# Patient Record
Sex: MALE | Race: BLACK OR AFRICAN AMERICAN | NOT HISPANIC OR LATINO
[De-identification: names, ages, dates, MRNs, and addresses within clinical notes are randomized per-mention and may not be internally consistent; named-entity substitution may affect disease eponyms.]

---

## 2017-08-30 ENCOUNTER — MEDICATION RENEWAL (OUTPATIENT)
Age: 57
End: 2017-08-30

## 2017-08-30 PROBLEM — Z00.00 ENCOUNTER FOR PREVENTIVE HEALTH EXAMINATION: Status: ACTIVE | Noted: 2017-08-30

## 2017-10-02 ENCOUNTER — MEDICATION RENEWAL (OUTPATIENT)
Age: 57
End: 2017-10-02

## 2017-10-02 RX ORDER — OXYCODONE AND ACETAMINOPHEN 10; 325 MG/1; MG/1
10-325 TABLET ORAL EVERY 8 HOURS
Qty: 90 | Refills: 0 | Status: COMPLETED | COMMUNITY
Start: 2017-08-30 | End: 2017-10-02

## 2017-10-30 ENCOUNTER — MEDICATION RENEWAL (OUTPATIENT)
Age: 57
End: 2017-10-30

## 2017-10-30 RX ORDER — OXYCODONE AND ACETAMINOPHEN 10; 325 MG/1; MG/1
10-325 TABLET ORAL EVERY 8 HOURS
Qty: 90 | Refills: 0 | Status: COMPLETED | COMMUNITY
Start: 2017-10-02 | End: 2017-10-30

## 2017-11-27 ENCOUNTER — MEDICATION RENEWAL (OUTPATIENT)
Age: 57
End: 2017-11-27

## 2017-11-27 RX ORDER — OXYCODONE AND ACETAMINOPHEN 10; 325 MG/1; MG/1
10-325 TABLET ORAL
Qty: 90 | Refills: 0 | Status: COMPLETED | COMMUNITY
Start: 2017-10-30 | End: 2017-11-27

## 2017-12-26 ENCOUNTER — MEDICATION RENEWAL (OUTPATIENT)
Age: 57
End: 2017-12-26

## 2017-12-26 RX ORDER — OXYCODONE AND ACETAMINOPHEN 10; 325 MG/1; MG/1
10-325 TABLET ORAL
Qty: 90 | Refills: 0 | Status: COMPLETED | COMMUNITY
Start: 2017-11-27 | End: 2017-12-26

## 2018-01-25 ENCOUNTER — APPOINTMENT (OUTPATIENT)
Dept: NEUROLOGY | Facility: CLINIC | Age: 58
End: 2018-01-25
Payer: MEDICARE

## 2018-01-25 ENCOUNTER — MEDICATION RENEWAL (OUTPATIENT)
Age: 58
End: 2018-01-25

## 2018-01-25 VITALS
BODY MASS INDEX: 30.23 KG/M2 | WEIGHT: 154 LBS | HEIGHT: 60 IN | SYSTOLIC BLOOD PRESSURE: 125 MMHG | DIASTOLIC BLOOD PRESSURE: 80 MMHG

## 2018-01-25 DIAGNOSIS — Z86.39 PERSONAL HISTORY OF OTHER ENDOCRINE, NUTRITIONAL AND METABOLIC DISEASE: ICD-10-CM

## 2018-01-25 PROCEDURE — 99214 OFFICE O/P EST MOD 30 MIN: CPT

## 2018-01-25 RX ORDER — LISINOPRIL 30 MG/1
TABLET ORAL
Refills: 0 | Status: ACTIVE | COMMUNITY

## 2018-01-25 RX ORDER — SIMVASTATIN 80 MG/1
TABLET, FILM COATED ORAL
Refills: 0 | Status: ACTIVE | COMMUNITY

## 2018-01-25 RX ORDER — METFORMIN HYDROCHLORIDE 625 MG/1
TABLET ORAL
Refills: 0 | Status: ACTIVE | COMMUNITY

## 2018-01-25 RX ORDER — OXYCODONE AND ACETAMINOPHEN 10; 325 MG/1; MG/1
10-325 TABLET ORAL
Qty: 90 | Refills: 0 | Status: COMPLETED | COMMUNITY
Start: 2017-12-26 | End: 2018-01-25

## 2018-02-22 ENCOUNTER — MEDICATION RENEWAL (OUTPATIENT)
Age: 58
End: 2018-02-22

## 2018-02-22 RX ORDER — OXYCODONE AND ACETAMINOPHEN 10; 325 MG/1; MG/1
10-325 TABLET ORAL
Qty: 90 | Refills: 0 | Status: COMPLETED | COMMUNITY
Start: 2018-01-25 | End: 2018-02-22

## 2018-03-22 ENCOUNTER — MEDICATION RENEWAL (OUTPATIENT)
Age: 58
End: 2018-03-22

## 2018-03-22 RX ORDER — OXYCODONE AND ACETAMINOPHEN 10; 325 MG/1; MG/1
10-325 TABLET ORAL
Qty: 90 | Refills: 0 | Status: COMPLETED | COMMUNITY
Start: 2018-02-22 | End: 2018-03-22

## 2018-04-19 ENCOUNTER — MEDICATION RENEWAL (OUTPATIENT)
Age: 58
End: 2018-04-19

## 2018-04-19 RX ORDER — OXYCODONE AND ACETAMINOPHEN 10; 325 MG/1; MG/1
10-325 TABLET ORAL
Qty: 90 | Refills: 0 | Status: COMPLETED | COMMUNITY
Start: 2018-03-22 | End: 2018-04-19

## 2018-05-15 ENCOUNTER — MEDICATION RENEWAL (OUTPATIENT)
Age: 58
End: 2018-05-15

## 2018-05-15 RX ORDER — OXYCODONE AND ACETAMINOPHEN 10; 325 MG/1; MG/1
10-325 TABLET ORAL
Qty: 90 | Refills: 0 | Status: COMPLETED | COMMUNITY
Start: 2018-04-19 | End: 2018-05-15

## 2018-06-15 ENCOUNTER — MEDICATION RENEWAL (OUTPATIENT)
Age: 58
End: 2018-06-15

## 2018-06-15 RX ORDER — OXYCODONE AND ACETAMINOPHEN 10; 325 MG/1; MG/1
10-325 TABLET ORAL
Qty: 90 | Refills: 0 | Status: COMPLETED | COMMUNITY
Start: 2018-05-15 | End: 2018-06-15

## 2018-07-16 ENCOUNTER — MEDICATION RENEWAL (OUTPATIENT)
Age: 58
End: 2018-07-16

## 2018-07-26 ENCOUNTER — APPOINTMENT (OUTPATIENT)
Dept: NEUROLOGY | Facility: CLINIC | Age: 58
End: 2018-07-26
Payer: MEDICARE

## 2018-07-26 VITALS
SYSTOLIC BLOOD PRESSURE: 120 MMHG | BODY MASS INDEX: 29.45 KG/M2 | WEIGHT: 150 LBS | DIASTOLIC BLOOD PRESSURE: 72 MMHG | HEIGHT: 60 IN

## 2018-07-26 PROCEDURE — 99213 OFFICE O/P EST LOW 20 MIN: CPT

## 2018-07-26 NOTE — HISTORY OF PRESENT ILLNESS
[FreeTextEntry1] : I saw this patient in the office today.\par He presents with his sister as usual. \par \par As you recall he has a history of traumatic brain injury and chronic headaches.\par He has residual right-sided spastic hemiparesis\par His headaches are now occurring on a daily basis. \par They wax and wane in intensity.\par When severe they are associated with some dizziness.\par \par He uses Percocet as needed for the pain. \par It has been the only medication that has worked for him.\par \par \par

## 2018-07-26 NOTE — PHYSICAL EXAM
[General Appearance - Alert] : alert [Oriented To Time, Place, And Person] : oriented to person, place, and time [Cranial Nerves Optic (II)] : visual acuity intact bilaterally,  visual fields full to confrontation, pupils equal round and reactive to light [Cranial Nerves Oculomotor (III)] : extraocular motion intact [Cranial Nerves Trigeminal (V)] : facial sensation intact symmetrically [Cranial Nerves Facial (VII)] : face symmetrical [Cranial Nerves Vestibulocochlear (VIII)] : hearing was intact bilaterally [Cranial Nerves Glossopharyngeal (IX)] : tongue and palate midline [Cranial Nerves Accessory (XI - Cranial And Spinal)] : head turning and shoulder shrug symmetric [Cranial Nerves Hypoglossal (XII)] : there was no tongue deviation with protrusion [Sensation Tactile Decrease] : light touch was intact [Sensation Vibration Decrease] : vibration was intact [3+] : Patella right 3+ [2+] : Patella left 2+ [Plantar Reflex Right Only] : abnormal on the right [FreeTextEntry1] : There is some mild impairment in recent memory as compared to remote memory. [Coordination - Dysmetria Impaired Finger-to-Nose Left Only] : not present on the left side [Plantar Reflex Left Only] : normal on the left [FreeTextEntry6] : There is increased tone throughout the right side. [FreeTextEntry8] : Gait is broad-based and he uses a walker for support. He circumduct the right leg. Balance is impaired.

## 2018-07-26 NOTE — ASSESSMENT
[FreeTextEntry1] : This is a 50-year-old man with a history of traumatic brain injury.\par He has residual spastic right sided hemiparesis.\par \par He also has residual headaches.\par He uses Percocet tablets for this.\par While this has been the only medication that has worked for his headaches, he does continue to report that his current dose maintains good pain control.\par \par I will see him back in 6 months.

## 2018-07-26 NOTE — DATA REVIEWED
[de-identified] : CT scan of the head was performed on 2/17/16. There were chronic changes related to his prior injury. There was no acute pathology.

## 2018-07-26 NOTE — CONSULT LETTER
[Dear  ___] : Dear ~MATT, [Courtesy Letter:] : I had the pleasure of seeing your patient, [unfilled], in my office today. [Please see my note below.] : Please see my note below. [Consult Closing:] : Thank you very much for allowing me to participate in the care of this patient.  If you have any questions, please do not hesitate to contact me. [Sincerely,] : Sincerely, [FreeTextEntry3] : Dionte Solitario MD.

## 2018-08-13 ENCOUNTER — MEDICATION RENEWAL (OUTPATIENT)
Age: 58
End: 2018-08-13

## 2018-09-14 ENCOUNTER — MEDICATION RENEWAL (OUTPATIENT)
Age: 58
End: 2018-09-14

## 2018-10-10 ENCOUNTER — MEDICATION RENEWAL (OUTPATIENT)
Age: 58
End: 2018-10-10

## 2018-11-07 ENCOUNTER — MEDICATION RENEWAL (OUTPATIENT)
Age: 58
End: 2018-11-07

## 2018-12-07 ENCOUNTER — MEDICATION RENEWAL (OUTPATIENT)
Age: 58
End: 2018-12-07

## 2019-01-08 ENCOUNTER — MEDICATION RENEWAL (OUTPATIENT)
Age: 59
End: 2019-01-08

## 2019-01-24 ENCOUNTER — APPOINTMENT (OUTPATIENT)
Dept: NEUROLOGY | Facility: CLINIC | Age: 59
End: 2019-01-24
Payer: MEDICARE

## 2019-01-24 VITALS
DIASTOLIC BLOOD PRESSURE: 70 MMHG | SYSTOLIC BLOOD PRESSURE: 115 MMHG | WEIGHT: 150 LBS | BODY MASS INDEX: 29.45 KG/M2 | HEIGHT: 60 IN

## 2019-01-24 PROCEDURE — 99214 OFFICE O/P EST MOD 30 MIN: CPT

## 2019-01-24 NOTE — DATA REVIEWED
[de-identified] : CT scan of the head was performed on 2/17/16. \par There were chronic changes related to his prior injury. \par There was no acute pathology.\par

## 2019-01-24 NOTE — REASON FOR VISIT
[Follow-Up: _____] : a [unfilled] follow-up visit [Other: _____] : [unfilled] [FreeTextEntry1] : CC: traumatic brain injury and headache

## 2019-01-24 NOTE — ASSESSMENT
[FreeTextEntry1] : This is a 58 year-old man with a history of traumatic brain injury.\par He has residual spastic right sided hemiparesis.\par He does physical therapy for this. \par \par He also has residual headaches.\par He uses Percocet tablets for this.\par While this has been the only medication that has worked for his headaches, he does continue to report that his current dose maintains good pain control.\par \par I will see him back in 6 months.

## 2019-01-24 NOTE — PHYSICAL EXAM
[General Appearance - Alert] : alert [General Appearance - In No Acute Distress] : in no acute distress [Oriented To Time, Place, And Person] : oriented to person, place, and time [Cranial Nerves Optic (II)] : visual acuity intact bilaterally,  visual fields full to confrontation, pupils equal round and reactive to light [Cranial Nerves Oculomotor (III)] : extraocular motion intact [Cranial Nerves Trigeminal (V)] : facial sensation intact symmetrically [Cranial Nerves Facial (VII)] : face symmetrical [Cranial Nerves Vestibulocochlear (VIII)] : hearing was intact bilaterally [Cranial Nerves Glossopharyngeal (IX)] : tongue and palate midline [Cranial Nerves Accessory (XI - Cranial And Spinal)] : head turning and shoulder shrug symmetric [Cranial Nerves Hypoglossal (XII)] : there was no tongue deviation with protrusion [Sensation Tactile Decrease] : light touch was intact [Sensation Pain / Temperature Decrease] : pain and temperature was intact [Sensation Vibration Decrease] : vibration was intact [3+] : Patella right 3+ [2+] : Patella left 2+ [Plantar Reflex Right Only] : abnormal on the right [Optic Disc Abnormality] : the optic disc were normal in size and color [Edema] : there was no peripheral edema [Involuntary Movements] : no involuntary movements were seen [FreeTextEntry1] : There is some mild impairment in recent memory as compared to remote memory. [Coordination - Dysmetria Impaired Finger-to-Nose Left Only] : not present on the left side [Plantar Reflex Left Only] : normal on the left [FreeTextEntry6] : There is increased tone throughout the right side. [FreeTextEntry8] : Gait is broad-based and he uses a walker for support. He circumduct the right leg. Balance is impaired.

## 2019-02-07 ENCOUNTER — MEDICATION RENEWAL (OUTPATIENT)
Age: 59
End: 2019-02-07

## 2019-03-07 ENCOUNTER — MEDICATION RENEWAL (OUTPATIENT)
Age: 59
End: 2019-03-07

## 2019-04-05 ENCOUNTER — MEDICATION RENEWAL (OUTPATIENT)
Age: 59
End: 2019-04-05

## 2019-05-06 ENCOUNTER — MEDICATION RENEWAL (OUTPATIENT)
Age: 59
End: 2019-05-06

## 2019-05-31 ENCOUNTER — MEDICATION RENEWAL (OUTPATIENT)
Age: 59
End: 2019-05-31

## 2019-07-02 ENCOUNTER — MEDICATION RENEWAL (OUTPATIENT)
Age: 59
End: 2019-07-02

## 2019-07-18 ENCOUNTER — APPOINTMENT (OUTPATIENT)
Dept: NEUROLOGY | Facility: CLINIC | Age: 59
End: 2019-07-18
Payer: MEDICARE

## 2019-07-18 PROCEDURE — 99213 OFFICE O/P EST LOW 20 MIN: CPT

## 2019-07-18 NOTE — DATA REVIEWED
[de-identified] : CT scan of the head was performed on 2/17/16. \par There were chronic changes related to his prior injury. \par There was no acute pathology.\par

## 2019-07-18 NOTE — ASSESSMENT
[FreeTextEntry1] : This is a 59 year-old man with a history of traumatic brain injury.\par He has residual spastic right sided hemiparesis.\par He does physical therapy for this. \par \par He also has residual headaches.\par He uses Percocet tablets for this.\par While this has been the only medication that has worked for his headaches, he does continue to report that his current dose maintains good pain control.\par \par I will see him back in 6 months.

## 2019-07-24 ENCOUNTER — APPOINTMENT (OUTPATIENT)
Dept: NEUROLOGY | Facility: CLINIC | Age: 59
End: 2019-07-24

## 2019-07-30 ENCOUNTER — MEDICATION RENEWAL (OUTPATIENT)
Age: 59
End: 2019-07-30

## 2019-08-26 ENCOUNTER — MEDICATION RENEWAL (OUTPATIENT)
Age: 59
End: 2019-08-26

## 2019-09-26 ENCOUNTER — MEDICATION RENEWAL (OUTPATIENT)
Age: 59
End: 2019-09-26

## 2019-10-21 ENCOUNTER — MEDICATION RENEWAL (OUTPATIENT)
Age: 59
End: 2019-10-21

## 2019-11-22 ENCOUNTER — MEDICATION RENEWAL (OUTPATIENT)
Age: 59
End: 2019-11-22

## 2019-12-20 ENCOUNTER — MEDICATION RENEWAL (OUTPATIENT)
Age: 59
End: 2019-12-20

## 2020-01-15 ENCOUNTER — APPOINTMENT (OUTPATIENT)
Dept: NEUROLOGY | Facility: CLINIC | Age: 60
End: 2020-01-15
Payer: MEDICARE

## 2020-01-15 VITALS
HEIGHT: 60 IN | WEIGHT: 151 LBS | SYSTOLIC BLOOD PRESSURE: 102 MMHG | BODY MASS INDEX: 29.64 KG/M2 | DIASTOLIC BLOOD PRESSURE: 70 MMHG

## 2020-01-15 PROCEDURE — 99213 OFFICE O/P EST LOW 20 MIN: CPT

## 2020-01-15 NOTE — PHYSICAL EXAM
[General Appearance - Alert] : alert [General Appearance - In No Acute Distress] : in no acute distress [Oriented To Time, Place, And Person] : oriented to person, place, and time [Cranial Nerves Optic (II)] : visual acuity intact bilaterally,  visual fields full to confrontation, pupils equal round and reactive to light [Cranial Nerves Oculomotor (III)] : extraocular motion intact [Cranial Nerves Trigeminal (V)] : facial sensation intact symmetrically [Cranial Nerves Vestibulocochlear (VIII)] : hearing was intact bilaterally [Cranial Nerves Facial (VII)] : face symmetrical [Cranial Nerves Hypoglossal (XII)] : there was no tongue deviation with protrusion [Cranial Nerves Glossopharyngeal (IX)] : tongue and palate midline [Cranial Nerves Accessory (XI - Cranial And Spinal)] : head turning and shoulder shrug symmetric [Sensation Vibration Decrease] : vibration was intact [Sensation Pain / Temperature Decrease] : pain and temperature was intact [Sensation Tactile Decrease] : light touch was intact [2+] : Patella left 2+ [3+] : Patella right 3+ [Plantar Reflex Right Only] : abnormal on the right [Optic Disc Abnormality] : the optic disc were normal in size and color [Edema] : there was no peripheral edema [Involuntary Movements] : no involuntary movements were seen [FreeTextEntry1] : There is some mild impairment in recent memory as compared to remote memory. [Plantar Reflex Left Only] : normal on the left [Coordination - Dysmetria Impaired Finger-to-Nose Left Only] : not present on the left side [FreeTextEntry8] : Gait is broad-based and he uses a walker for support. He circumduct the right leg. Balance is impaired. [FreeTextEntry6] : There is increased tone throughout the right side.

## 2020-01-15 NOTE — CONSULT LETTER
[Dear  ___] : Dear ~MATT, [Courtesy Letter:] : I had the pleasure of seeing your patient, [unfilled], in my office today. [Consult Closing:] : Thank you very much for allowing me to participate in the care of this patient.  If you have any questions, please do not hesitate to contact me. [Please see my note below.] : Please see my note below. [Sincerely,] : Sincerely, [FreeTextEntry3] : Dionte Solitario MD.

## 2020-01-15 NOTE — DATA REVIEWED
[de-identified] : CT scan of the head was performed on 2/17/16. \par There were chronic changes related to his prior injury. \par There was no acute pathology.\par

## 2020-07-16 ENCOUNTER — APPOINTMENT (OUTPATIENT)
Dept: NEUROLOGY | Facility: CLINIC | Age: 60
End: 2020-07-16
Payer: MEDICARE

## 2020-07-16 VITALS
SYSTOLIC BLOOD PRESSURE: 140 MMHG | WEIGHT: 151 LBS | DIASTOLIC BLOOD PRESSURE: 80 MMHG | TEMPERATURE: 97.6 F | HEIGHT: 60 IN | BODY MASS INDEX: 29.64 KG/M2

## 2020-07-16 DIAGNOSIS — Z86.79 PERSONAL HISTORY OF OTHER DISEASES OF THE CIRCULATORY SYSTEM: ICD-10-CM

## 2020-07-16 PROCEDURE — 99214 OFFICE O/P EST MOD 30 MIN: CPT

## 2020-07-16 NOTE — HISTORY OF PRESENT ILLNESS
[FreeTextEntry1] : I saw this patient in the office today.\par He presents with his sister as usual. \par \par As you recall he has a history of traumatic brain injury and chronic headaches.\par He has residual right-sided spastic hemiparesis\par His headaches are now occurring on a daily basis. \par They wax and wane in intensity.\par When severe they are associated with some dizziness.\par \par He uses Percocet as needed for the pain. \par It has been the only medication that has worked for him.\par \par He uses a walker for ambulation at baseline.\par His family reports that he has fallen on a few occasions since his last visit in January.\par

## 2020-07-16 NOTE — PHYSICAL EXAM
[General Appearance - Alert] : alert [General Appearance - In No Acute Distress] : in no acute distress [Oriented To Time, Place, And Person] : oriented to person, place, and time [Cranial Nerves Optic (II)] : visual acuity intact bilaterally,  visual fields full to confrontation, pupils equal round and reactive to light [Cranial Nerves Oculomotor (III)] : extraocular motion intact [Cranial Nerves Trigeminal (V)] : facial sensation intact symmetrically [Cranial Nerves Facial (VII)] : face symmetrical [Cranial Nerves Vestibulocochlear (VIII)] : hearing was intact bilaterally [Cranial Nerves Glossopharyngeal (IX)] : tongue and palate midline [Cranial Nerves Accessory (XI - Cranial And Spinal)] : head turning and shoulder shrug symmetric [Cranial Nerves Hypoglossal (XII)] : there was no tongue deviation with protrusion [Sensation Tactile Decrease] : light touch was intact [Sensation Pain / Temperature Decrease] : pain and temperature was intact [Sensation Vibration Decrease] : vibration was intact [3+] : Patella right 3+ [2+] : Patella left 2+ [Plantar Reflex Right Only] : abnormal on the right [Optic Disc Abnormality] : the optic disc were normal in size and color [Edema] : there was no peripheral edema [Involuntary Movements] : no involuntary movements were seen [FreeTextEntry1] : There is some mild impairment in recent memory as compared to remote memory. [Plantar Reflex Left Only] : normal on the left [Coordination - Dysmetria Impaired Finger-to-Nose Left Only] : not present on the left side [FreeTextEntry6] : There is increased tone throughout the right side. [FreeTextEntry8] : Gait is broad-based and he uses a walker for support. He circumduct the right leg. Balance is impaired.

## 2020-07-16 NOTE — ASSESSMENT
[FreeTextEntry1] : This is a 60 year-old man with a history of traumatic brain injury.\par He has residual spastic right sided hemiparesis.\par He does physical therapy for this. \par I would recommend he use a helmet when he is ambulating outside.\par \par He also has residual headaches.\par He uses Percocet tablets for this.\par While this has been the only medication that has worked for his headaches, he does continue to report that his current dose maintains good pain control.\par \par Blood Pressure was fairly well controlled today.\par He has followup with his primary care this week.\par \par I will see him back in 6 months.

## 2021-02-17 ENCOUNTER — APPOINTMENT (OUTPATIENT)
Dept: NEUROLOGY | Facility: CLINIC | Age: 61
End: 2021-02-17

## 2021-04-08 ENCOUNTER — APPOINTMENT (OUTPATIENT)
Dept: NEUROLOGY | Facility: CLINIC | Age: 61
End: 2021-04-08
Payer: MEDICARE

## 2021-04-08 VITALS
SYSTOLIC BLOOD PRESSURE: 120 MMHG | HEIGHT: 60 IN | BODY MASS INDEX: 29.45 KG/M2 | DIASTOLIC BLOOD PRESSURE: 70 MMHG | WEIGHT: 150 LBS | TEMPERATURE: 97.8 F

## 2021-04-08 PROCEDURE — 99214 OFFICE O/P EST MOD 30 MIN: CPT

## 2021-04-08 NOTE — DATA REVIEWED
[de-identified] : CT scan of the head was performed on 2/17/16. \par There were chronic changes related to his prior injury. \par There was no acute pathology.\par

## 2021-10-14 ENCOUNTER — APPOINTMENT (OUTPATIENT)
Dept: NEUROLOGY | Facility: CLINIC | Age: 61
End: 2021-10-14
Payer: MEDICARE

## 2021-10-14 VITALS
WEIGHT: 150 LBS | SYSTOLIC BLOOD PRESSURE: 120 MMHG | TEMPERATURE: 97.9 F | BODY MASS INDEX: 29.45 KG/M2 | HEIGHT: 60 IN | DIASTOLIC BLOOD PRESSURE: 70 MMHG

## 2021-10-14 PROCEDURE — 99213 OFFICE O/P EST LOW 20 MIN: CPT

## 2021-10-14 NOTE — ASSESSMENT
[FreeTextEntry1] : This is a 61 year-old man with a history of traumatic brain injury.\par He has residual spastic right sided hemiparesis.\par He does physical therapy for this. \par I recommended he use his helmet when he is ambulating outside.\par \par He also has residual headaches.\par He uses Percocet tablets for this.\par While this has been the only medication that has worked for his headaches, he does continue to report that his current dose maintains good pain control.\par \par I will see him back in 6 months.

## 2021-10-14 NOTE — DATA REVIEWED
[de-identified] : CT scan of the head was performed on 2/17/16. \par There were chronic changes related to his prior injury. \par There was no acute pathology.\par

## 2021-10-14 NOTE — HISTORY OF PRESENT ILLNESS
[FreeTextEntry1] : I saw this patient in the office today.\par He presents with his niece (as his sister was not feeling well today).\par \par As you recall he has a history of traumatic brain injury and chronic headaches.\par He has residual right-sided spastic hemiparesis\par His headaches are now occurring on a daily basis. \par They wax and wane in intensity.\par When severe they are associated with some dizziness.\par \par He uses Percocet as needed for the pain. \par It has been the only medication that has worked for him.\par \par He uses a walker for ambulation at baseline.\par \par

## 2022-04-14 ENCOUNTER — APPOINTMENT (OUTPATIENT)
Dept: NEUROLOGY | Facility: CLINIC | Age: 62
End: 2022-04-14
Payer: MEDICARE

## 2022-04-14 VITALS
HEIGHT: 60 IN | DIASTOLIC BLOOD PRESSURE: 80 MMHG | BODY MASS INDEX: 29.45 KG/M2 | SYSTOLIC BLOOD PRESSURE: 140 MMHG | WEIGHT: 150 LBS

## 2022-04-14 PROCEDURE — 99214 OFFICE O/P EST MOD 30 MIN: CPT

## 2022-04-14 NOTE — HISTORY OF PRESENT ILLNESS
[FreeTextEntry1] : I saw this patient in the office today.\par He presents with his niece (as his sister was not feeling well today).\par \par As you recall he has a history of traumatic brain injury and chronic headaches.\par He has residual right-sided spastic hemiparesis\par His headaches are now occurring on a daily basis. \par They wax and wane in intensity.\par When severe they are associated with some dizziness.\par \par He uses Percocet as needed for the pain. \par It has been the only medication that has worked for him.\par \par He uses a walker for ambulation at baseline.\par His gait and balance has been progressively declining.\par

## 2022-04-14 NOTE — DATA REVIEWED
[de-identified] : CT scan of the head was performed on 2/17/16. \par There were chronic changes related to his prior injury. \par There was no acute pathology.\par

## 2022-04-14 NOTE — PHYSICAL EXAM
[General Appearance - Alert] : alert [General Appearance - In No Acute Distress] : in no acute distress [Oriented To Time, Place, And Person] : oriented to person, place, and time [Cranial Nerves Optic (II)] : visual acuity intact bilaterally,  visual fields full to confrontation, pupils equal round and reactive to light [Cranial Nerves Oculomotor (III)] : extraocular motion intact [Cranial Nerves Trigeminal (V)] : facial sensation intact symmetrically [Cranial Nerves Facial (VII)] : face symmetrical [Cranial Nerves Vestibulocochlear (VIII)] : hearing was intact bilaterally [Cranial Nerves Glossopharyngeal (IX)] : tongue and palate midline [Cranial Nerves Accessory (XI - Cranial And Spinal)] : head turning and shoulder shrug symmetric [Cranial Nerves Hypoglossal (XII)] : there was no tongue deviation with protrusion [Sensation Tactile Decrease] : light touch was intact [Sensation Pain / Temperature Decrease] : pain and temperature was intact [Sensation Vibration Decrease] : vibration was intact [3+] : Patella right 3+ [2+] : Patella left 2+ [Plantar Reflex Right Only] : abnormal on the right [Optic Disc Abnormality] : the optic disc were normal in size and color [Edema] : there was no peripheral edema [Involuntary Movements] : no involuntary movements were seen [FreeTextEntry1] : There is some mild impairment in recent memory as compared to remote memory. [Limited Balance] : the patient's balance was impaired [Coordination - Dysmetria Impaired Finger-to-Nose Left Only] : not present on the left side [Plantar Reflex Left Only] : normal on the left [FreeTextEntry6] : There is increased tone throughout the right side. [FreeTextEntry8] : Gait is broad-based and he uses a walker for support. He circumduct the right leg. Balance is impaired.

## 2022-10-19 ENCOUNTER — APPOINTMENT (OUTPATIENT)
Dept: NEUROLOGY | Facility: CLINIC | Age: 62
End: 2022-10-19

## 2022-10-19 PROCEDURE — 99213 OFFICE O/P EST LOW 20 MIN: CPT

## 2022-10-19 RX ORDER — KETOCONAZOLE 20 MG/G
2 CREAM TOPICAL
Qty: 60 | Refills: 0 | Status: ACTIVE | COMMUNITY
Start: 2022-09-15

## 2022-10-19 RX ORDER — PEN NEEDLE, DIABETIC 32GX 5/32"
70 NEEDLE, DISPOSABLE MISCELLANEOUS
Qty: 100 | Refills: 0 | Status: ACTIVE | COMMUNITY
Start: 2022-02-07

## 2022-10-19 RX ORDER — LATANOPROST/PF 0.005 %
0.01 DROPS OPHTHALMIC (EYE)
Qty: 7 | Refills: 0 | Status: ACTIVE | COMMUNITY
Start: 2022-05-17

## 2022-10-19 RX ORDER — ATORVASTATIN CALCIUM 40 MG/1
40 TABLET, FILM COATED ORAL
Qty: 90 | Refills: 0 | Status: ACTIVE | COMMUNITY
Start: 2022-03-28

## 2022-10-19 NOTE — ASSESSMENT
[FreeTextEntry1] : This is a 62 year-old man with a history of traumatic brain injury.\par He has residual spastic right sided hemiparesis.\par He does physical therapy for this. \par I recommended he use his helmet when he is ambulating outside.\par \par He also has residual headaches.\par He uses Percocet tablets for this.\par While this has been the only medication that has worked for his headaches, he does continue to report that his current dose maintains good pain control.\par \par I will see him back in 6 months.

## 2022-10-19 NOTE — PHYSICAL EXAM
[General Appearance - Alert] : alert [General Appearance - In No Acute Distress] : in no acute distress [Oriented To Time, Place, And Person] : oriented to person, place, and time [Cranial Nerves Optic (II)] : visual acuity intact bilaterally,  visual fields full to confrontation, pupils equal round and reactive to light [Cranial Nerves Oculomotor (III)] : extraocular motion intact [Cranial Nerves Trigeminal (V)] : facial sensation intact symmetrically [Cranial Nerves Facial (VII)] : face symmetrical [Cranial Nerves Vestibulocochlear (VIII)] : hearing was intact bilaterally [Cranial Nerves Glossopharyngeal (IX)] : tongue and palate midline [Cranial Nerves Accessory (XI - Cranial And Spinal)] : head turning and shoulder shrug symmetric [Cranial Nerves Hypoglossal (XII)] : there was no tongue deviation with protrusion [Sensation Tactile Decrease] : light touch was intact [Sensation Pain / Temperature Decrease] : pain and temperature was intact [Sensation Vibration Decrease] : vibration was intact [Limited Balance] : the patient's balance was impaired [3+] : Patella right 3+ [2+] : Patella left 2+ [Plantar Reflex Right Only] : abnormal on the right [Optic Disc Abnormality] : the optic disc were normal in size and color [Edema] : there was no peripheral edema [Involuntary Movements] : no involuntary movements were seen [FreeTextEntry1] : There is some mild impairment in recent memory as compared to remote memory. [Coordination - Dysmetria Impaired Finger-to-Nose Left Only] : not present on the left side [Plantar Reflex Left Only] : normal on the left [FreeTextEntry6] : There is increased tone throughout the right side. [FreeTextEntry8] : Gait is broad-based and he uses a walker for support. He circumduct the right leg. Balance is impaired.

## 2022-10-19 NOTE — DATA REVIEWED
[de-identified] : CT scan of the head was performed on 2/17/16. \par There were chronic changes related to his prior injury. \par There was no acute pathology.\par

## 2022-11-16 ENCOUNTER — OFFICE (OUTPATIENT)
Dept: URBAN - METROPOLITAN AREA CLINIC 6 | Facility: CLINIC | Age: 62
Setting detail: OPHTHALMOLOGY
End: 2022-11-16
Payer: MEDICARE

## 2022-11-16 DIAGNOSIS — H25.13: ICD-10-CM

## 2022-11-16 DIAGNOSIS — H40.1131: ICD-10-CM

## 2022-11-16 DIAGNOSIS — E11.9: ICD-10-CM

## 2022-11-16 DIAGNOSIS — S06.2X6S: ICD-10-CM

## 2022-11-16 DIAGNOSIS — H52.4: ICD-10-CM

## 2022-11-16 DIAGNOSIS — H16.223: ICD-10-CM

## 2022-11-16 DIAGNOSIS — H35.412: ICD-10-CM

## 2022-11-16 PROCEDURE — 92250 FUNDUS PHOTOGRAPHY W/I&R: CPT | Performed by: OPHTHALMOLOGY

## 2022-11-16 PROCEDURE — 92012 INTRM OPH EXAM EST PATIENT: CPT | Performed by: OPHTHALMOLOGY

## 2022-11-16 PROCEDURE — 92015 DETERMINE REFRACTIVE STATE: CPT | Performed by: OPHTHALMOLOGY

## 2022-11-16 ASSESSMENT — KERATOMETRY
OD_AXISANGLE_DEGREES: 53
OS_K1POWER_DIOPTERS: 45.00
OS_AXISANGLE_DEGREES: 34
METHOD_AUTO_MANUAL: AUTO
OD_K1POWER_DIOPTERS: 45.25
OS_K2POWER_DIOPTERS: 45.50
OD_K2POWER_DIOPTERS: 46.00

## 2022-11-16 ASSESSMENT — AXIALLENGTH_DERIVED
OD_AL: 23.7376
OS_AL: 23.7785
OS_AL: 23.7785
OD_AL: 23.7376
OS_AL: 23.7785
OD_AL: 23.7376

## 2022-11-16 ASSESSMENT — VISUAL ACUITY
OS_BCVA: 20/80-1
OD_BCVA: 20/80-

## 2022-11-16 ASSESSMENT — REFRACTION_CURRENTRX
OS_VPRISM_DIRECTION: SV
OD_SPHERE: -2.25
OS_OVR_VA: 20/
OS_CYLINDER: -0.50
OS_SPHERE: -2.25
OD_CYLINDER: -0.75
OD_AXIS: 102
OD_OVR_VA: 20/
OD_VPRISM_DIRECTION: SV
OS_AXIS: 110

## 2022-11-16 ASSESSMENT — SPHEQUIV_DERIVED
OD_SPHEQUIV: -2.375
OS_SPHEQUIV: -2.125

## 2022-11-16 ASSESSMENT — TONOMETRY
OS_IOP_MMHG: 16
OD_IOP_MMHG: 16

## 2022-11-16 ASSESSMENT — REFRACTION_MANIFEST
OD_AXIS: 115
OS_CYLINDER: -0.75
OD_AXIS: 115
OD_ADD: +2.50
OS_AXIS: 105
OD_VA1: 20/25
OS_SPHERE: -1.75
OS_VA1: 20/25-2
OS_ADD: +2.50
OD_CYLINDER: -1.25
OS_CYLINDER: -0.75
OS_VA1: 20/25-2
OU_VA: 20/40
OS_ADD: +2.50
OS_SPHERE: -1.75
OD_SPHERE: -1.75
OD_VA1: 20/25
OU_VA: 20/40
OS_AXIS: 103
OD_SPHERE: -1.75
OD_CYLINDER: -1.25
OD_ADD: +2.50

## 2022-11-16 ASSESSMENT — REFRACTION_AUTOREFRACTION
OD_CYLINDER: -1.25
OS_SPHERE: -1.75
OS_CYLINDER: -0.75
OD_SPHERE: -1.75
OD_AXIS: 115
OS_AXIS: 103

## 2022-11-16 ASSESSMENT — CONFRONTATIONAL VISUAL FIELD TEST (CVF)
OD_FINDINGS: FULL
OS_FINDINGS: FULL

## 2022-11-16 ASSESSMENT — SUPERFICIAL PUNCTATE KERATITIS (SPK)
OD_SPK: T
OS_SPK: T

## 2022-11-16 ASSESSMENT — PACHYMETRY
OD_CT_CORRECTION: 1
OS_CT_UM: 530
OD_CT_UM: 537
OS_CT_CORRECTION: 1

## 2023-01-24 ENCOUNTER — OFFICE (OUTPATIENT)
Dept: URBAN - METROPOLITAN AREA CLINIC 6 | Facility: CLINIC | Age: 63
Setting detail: OPHTHALMOLOGY
End: 2023-01-24
Payer: MEDICARE

## 2023-01-24 DIAGNOSIS — H40.1131: ICD-10-CM

## 2023-01-24 DIAGNOSIS — H16.223: ICD-10-CM

## 2023-01-24 DIAGNOSIS — H25.13: ICD-10-CM

## 2023-01-24 PROCEDURE — 99213 OFFICE O/P EST LOW 20 MIN: CPT | Performed by: OPHTHALMOLOGY

## 2023-01-24 ASSESSMENT — SPHEQUIV_DERIVED
OS_SPHEQUIV: -2.125
OD_SPHEQUIV: -2.375
OS_SPHEQUIV: -2.125
OS_SPHEQUIV: -2.125
OD_SPHEQUIV: -2.375
OD_SPHEQUIV: -2.375

## 2023-01-24 ASSESSMENT — TONOMETRY
OS_IOP_MMHG: 16
OD_IOP_MMHG: 16

## 2023-01-24 ASSESSMENT — REFRACTION_AUTOREFRACTION
OS_SPHERE: -1.75
OD_AXIS: 115
OS_AXIS: 109
OD_SPHERE: -1.75
OS_CYLINDER: -0.75
OD_CYLINDER: -1.25

## 2023-01-24 ASSESSMENT — AXIALLENGTH_DERIVED
OD_AL: 23.7842
OS_AL: 23.8252
OD_AL: 23.7842
OS_AL: 23.8252
OS_AL: 23.8252
OD_AL: 23.7842

## 2023-01-24 ASSESSMENT — REFRACTION_MANIFEST
OU_VA: 20/40
OS_SPHERE: -1.75
OS_VA1: 20/25-2
OS_CYLINDER: -0.75
OS_AXIS: 103
OD_CYLINDER: -1.25
OD_VA1: 20/25
OS_CYLINDER: -0.75
OS_SPHERE: -1.75
OD_ADD: +2.50
OD_ADD: +2.50
OD_VA1: 20/25
OD_SPHERE: -1.75
OD_SPHERE: -1.75
OD_AXIS: 115
OS_ADD: +2.50
OD_AXIS: 115
OS_ADD: +2.50
OU_VA: 20/40
OS_VA1: 20/25-2
OD_CYLINDER: -1.25
OS_AXIS: 105

## 2023-01-24 ASSESSMENT — KERATOMETRY
OD_AXISANGLE_DEGREES: 046
OS_K2POWER_DIOPTERS: 45.25
OD_K2POWER_DIOPTERS: 46.00
METHOD_AUTO_MANUAL: AUTO
OS_AXISANGLE_DEGREES: 040
OS_K1POWER_DIOPTERS: 45.00
OD_K1POWER_DIOPTERS: 45.00

## 2023-01-24 ASSESSMENT — CONFRONTATIONAL VISUAL FIELD TEST (CVF)
OD_FINDINGS: FULL
OS_FINDINGS: FULL

## 2023-01-24 ASSESSMENT — REFRACTION_CURRENTRX
OD_CYLINDER: -0.75
OS_CYLINDER: -0.50
OS_AXIS: 110
OS_SPHERE: -2.25
OD_SPHERE: -2.25
OS_VPRISM_DIRECTION: SV
OS_OVR_VA: 20/
OD_AXIS: 102
OD_OVR_VA: 20/
OD_VPRISM_DIRECTION: SV

## 2023-01-24 ASSESSMENT — SUPERFICIAL PUNCTATE KERATITIS (SPK)
OS_SPK: T
OD_SPK: T

## 2023-01-24 ASSESSMENT — PACHYMETRY
OD_CT_UM: 537
OD_CT_CORRECTION: 1
OS_CT_UM: 530
OS_CT_CORRECTION: 1

## 2023-01-24 ASSESSMENT — VISUAL ACUITY
OS_BCVA: 20/100
OD_BCVA: 20/100-1

## 2023-04-11 NOTE — REASON FOR VISIT
attempted to reach pt, message left to call us back.      ----- Message from Ford Buckley MD sent at 4/11/2023  3:49 PM CDT -----  Please tell patient her 3 hour gtt was 1/4 abnormal so no gdm     [Follow-Up: _____] : a [unfilled] follow-up visit [Other: _____] : [unfilled] [FreeTextEntry1] : CC: traumatic brain injury and headache

## 2023-04-14 ENCOUNTER — APPOINTMENT (OUTPATIENT)
Dept: NEUROLOGY | Facility: CLINIC | Age: 63
End: 2023-04-14
Payer: MEDICARE

## 2023-04-14 PROCEDURE — 99213 OFFICE O/P EST LOW 20 MIN: CPT

## 2023-04-14 NOTE — DATA REVIEWED
[de-identified] : CT scan of the head was performed on 2/17/16. \par There were chronic changes related to his prior injury. \par There was no acute pathology.\par

## 2023-04-14 NOTE — PHYSICAL EXAM
[General Appearance - Alert] : alert [General Appearance - In No Acute Distress] : in no acute distress [Oriented To Time, Place, And Person] : oriented to person, place, and time [Cranial Nerves Optic (II)] : visual acuity intact bilaterally,  visual fields full to confrontation, pupils equal round and reactive to light [Cranial Nerves Oculomotor (III)] : extraocular motion intact [Cranial Nerves Trigeminal (V)] : facial sensation intact symmetrically [Cranial Nerves Vestibulocochlear (VIII)] : hearing was intact bilaterally [Cranial Nerves Facial (VII)] : face symmetrical [Cranial Nerves Glossopharyngeal (IX)] : tongue and palate midline [Cranial Nerves Accessory (XI - Cranial And Spinal)] : head turning and shoulder shrug symmetric [Cranial Nerves Hypoglossal (XII)] : there was no tongue deviation with protrusion [Sensation Tactile Decrease] : light touch was intact [Sensation Pain / Temperature Decrease] : pain and temperature was intact [Sensation Vibration Decrease] : vibration was intact [Limited Balance] : the patient's balance was impaired [3+] : Patella right 3+ [2+] : Patella left 2+ [Plantar Reflex Right Only] : abnormal on the right [Optic Disc Abnormality] : the optic disc were normal in size and color [Edema] : there was no peripheral edema [Involuntary Movements] : no involuntary movements were seen [FreeTextEntry1] : There is some mild impairment in recent memory as compared to remote memory. [Coordination - Dysmetria Impaired Finger-to-Nose Left Only] : not present on the left side [Plantar Reflex Left Only] : normal on the left [FreeTextEntry6] : There is increased tone throughout the right side. [FreeTextEntry8] : Gait is broad-based and he uses a walker for support. He circumduct the right leg. Balance is impaired.

## 2023-04-14 NOTE — HISTORY OF PRESENT ILLNESS
[FreeTextEntry1] : I saw this patient in the office today.\par He presents with his sister today.\par \par As you recall he has a history of traumatic brain injury and chronic headaches.\par He has residual right-sided spastic hemiparesis\par His headaches are now occurring on a daily basis. \par They wax and wane in intensity.\par When severe they are associated with some dizziness.\par \par He uses Percocet as needed for the pain. \par It has been the only medication that has worked for him.\par \par He uses a walker for ambulation at baseline.\par His gait and balance has been progressively declining.\par

## 2023-04-19 ENCOUNTER — APPOINTMENT (OUTPATIENT)
Dept: NEUROLOGY | Facility: CLINIC | Age: 63
End: 2023-04-19

## 2023-05-26 ENCOUNTER — OFFICE (OUTPATIENT)
Dept: URBAN - METROPOLITAN AREA CLINIC 6 | Facility: CLINIC | Age: 63
Setting detail: OPHTHALMOLOGY
End: 2023-05-26
Payer: MEDICARE

## 2023-05-26 DIAGNOSIS — H25.13: ICD-10-CM

## 2023-05-26 DIAGNOSIS — H35.412: ICD-10-CM

## 2023-05-26 DIAGNOSIS — H16.223: ICD-10-CM

## 2023-05-26 DIAGNOSIS — S06.2X6S: ICD-10-CM

## 2023-05-26 DIAGNOSIS — E11.9: ICD-10-CM

## 2023-05-26 DIAGNOSIS — H40.1131: ICD-10-CM

## 2023-05-26 PROCEDURE — 92250 FUNDUS PHOTOGRAPHY W/I&R: CPT | Performed by: OPHTHALMOLOGY

## 2023-05-26 PROCEDURE — 92014 COMPRE OPH EXAM EST PT 1/>: CPT | Performed by: OPHTHALMOLOGY

## 2023-05-26 ASSESSMENT — REFRACTION_MANIFEST
OD_ADD: +2.50
OU_VA: 20/40
OS_SPHERE: -1.75
OD_SPHERE: -1.75
OU_VA: 20/40
OS_CYLINDER: -0.75
OS_ADD: +2.50
OD_CYLINDER: -1.25
OD_AXIS: 115
OD_CYLINDER: -1.25
OS_CYLINDER: -0.75
OS_VA1: 20/25-2
OD_AXIS: 115
OD_VA1: 20/25
OS_SPHERE: -1.75
OS_AXIS: 105
OS_ADD: +2.50
OD_SPHERE: -1.75
OS_AXIS: 103
OD_VA1: 20/25
OS_VA1: 20/25-2
OD_ADD: +2.50

## 2023-05-26 ASSESSMENT — REFRACTION_CURRENTRX
OD_SPHERE: -2.25
OS_SPHERE: -2.25
OD_AXIS: 102
OD_OVR_VA: 20/
OS_VPRISM_DIRECTION: SV
OS_AXIS: 110
OD_CYLINDER: -0.75
OD_VPRISM_DIRECTION: SV
OS_OVR_VA: 20/
OS_CYLINDER: -0.50

## 2023-05-26 ASSESSMENT — REFRACTION_AUTOREFRACTION
OD_CYLINDER: -1.25
OS_SPHERE: -2.00
OS_CYLINDER: -1.00
OS_AXIS: 107
OD_AXIS: 111
OD_SPHERE: -2.00

## 2023-05-26 ASSESSMENT — KERATOMETRY
OD_K1POWER_DIOPTERS: 45.00
OS_AXISANGLE_DEGREES: 038
OS_K2POWER_DIOPTERS: 45.50
OD_K2POWER_DIOPTERS: 45.75
OD_AXISANGLE_DEGREES: 055
OS_K1POWER_DIOPTERS: 45.00
METHOD_AUTO_MANUAL: AUTO

## 2023-05-26 ASSESSMENT — PACHYMETRY
OS_CT_UM: 530
OD_CT_UM: 537
OD_CT_CORRECTION: 1
OS_CT_CORRECTION: 1

## 2023-05-26 ASSESSMENT — SUPERFICIAL PUNCTATE KERATITIS (SPK)
OS_SPK: T
OD_SPK: T

## 2023-05-26 ASSESSMENT — VISUAL ACUITY
OS_BCVA: 20/80
OD_BCVA: 20/100

## 2023-05-26 ASSESSMENT — AXIALLENGTH_DERIVED
OS_AL: 23.9278
OD_AL: 23.9307
OS_AL: 23.7785
OS_AL: 23.7785
OD_AL: 23.8309
OD_AL: 23.8309

## 2023-05-26 ASSESSMENT — SPHEQUIV_DERIVED
OD_SPHEQUIV: -2.375
OS_SPHEQUIV: -2.125
OD_SPHEQUIV: -2.625
OD_SPHEQUIV: -2.375
OS_SPHEQUIV: -2.125
OS_SPHEQUIV: -2.5

## 2023-05-26 ASSESSMENT — CONFRONTATIONAL VISUAL FIELD TEST (CVF)
OS_FINDINGS: FULL
OD_FINDINGS: FULL

## 2023-05-26 ASSESSMENT — TONOMETRY
OD_IOP_MMHG: 15
OS_IOP_MMHG: 15

## 2023-07-03 RX ORDER — HYDROCODONE BITARTRATE AND ACETAMINOPHEN 10; 325 MG/1; MG/1
10-325 TABLET ORAL EVERY 8 HOURS
Qty: 90 | Refills: 0 | Status: COMPLETED | COMMUNITY
Start: 2023-06-09 | End: 2023-07-03

## 2023-09-25 ENCOUNTER — NON-APPOINTMENT (OUTPATIENT)
Age: 63
End: 2023-09-25

## 2023-10-19 ENCOUNTER — APPOINTMENT (OUTPATIENT)
Dept: NEUROLOGY | Facility: CLINIC | Age: 63
End: 2023-10-19
Payer: MEDICARE

## 2023-10-19 VITALS — SYSTOLIC BLOOD PRESSURE: 130 MMHG | DIASTOLIC BLOOD PRESSURE: 70 MMHG

## 2023-10-19 PROCEDURE — 99214 OFFICE O/P EST MOD 30 MIN: CPT

## 2023-10-20 ENCOUNTER — NON-APPOINTMENT (OUTPATIENT)
Age: 63
End: 2023-10-20

## 2023-11-16 ENCOUNTER — NON-APPOINTMENT (OUTPATIENT)
Age: 63
End: 2023-11-16

## 2023-12-18 ENCOUNTER — NON-APPOINTMENT (OUTPATIENT)
Age: 63
End: 2023-12-18

## 2023-12-20 ENCOUNTER — OFFICE (OUTPATIENT)
Dept: URBAN - METROPOLITAN AREA CLINIC 6 | Facility: CLINIC | Age: 63
Setting detail: OPHTHALMOLOGY
End: 2023-12-20
Payer: MEDICARE

## 2023-12-20 DIAGNOSIS — H25.13: ICD-10-CM

## 2023-12-20 DIAGNOSIS — H16.223: ICD-10-CM

## 2023-12-20 DIAGNOSIS — H52.4: ICD-10-CM

## 2023-12-20 DIAGNOSIS — S06.2X6S: ICD-10-CM

## 2023-12-20 DIAGNOSIS — H40.1131: ICD-10-CM

## 2023-12-20 PROCEDURE — 92015 DETERMINE REFRACTIVE STATE: CPT | Performed by: OPHTHALMOLOGY

## 2023-12-20 PROCEDURE — 99214 OFFICE O/P EST MOD 30 MIN: CPT | Performed by: OPHTHALMOLOGY

## 2023-12-20 PROCEDURE — 92083 EXTENDED VISUAL FIELD XM: CPT | Performed by: OPHTHALMOLOGY

## 2023-12-20 PROCEDURE — 92133 CPTRZD OPH DX IMG PST SGM ON: CPT | Performed by: OPHTHALMOLOGY

## 2023-12-20 ASSESSMENT — REFRACTION_CURRENTRX
OD_CYLINDER: -0.75
OS_AXIS: 110
OD_VPRISM_DIRECTION: SV
OS_OVR_VA: 20/
OD_SPHERE: -2.25
OD_AXIS: 102
OS_SPHERE: -2.25
OD_OVR_VA: 20/
OS_VPRISM_DIRECTION: SV
OS_CYLINDER: -0.50

## 2023-12-20 ASSESSMENT — SPHEQUIV_DERIVED
OD_SPHEQUIV: -2.375
OD_SPHEQUIV: -2.875
OS_SPHEQUIV: -2.375
OS_SPHEQUIV: -2.125
OS_SPHEQUIV: -2.375
OD_SPHEQUIV: -2.875

## 2023-12-20 ASSESSMENT — CONFRONTATIONAL VISUAL FIELD TEST (CVF)
OS_FINDINGS: FULL
OD_FINDINGS: FULL

## 2023-12-20 ASSESSMENT — REFRACTION_MANIFEST
OU_VA: 20/40
OS_SPHERE: -1.75
OS_CYLINDER: -0.75
OU_VA: 20/40
OS_VA1: 20/25-2
OD_CYLINDER: -1.25
OD_VA1: 20/25
OS_VA1: 20/25-2
OS_ADD: +2.50
OD_SPHERE: -2.25
OD_AXIS: 105
OS_SPHERE: -2.00
OS_ADD: +2.50
OS_AXIS: 095
OD_ADD: +2.50
OD_SPHERE: -1.75
OD_AXIS: 115
OD_ADD: +2.50
OS_CYLINDER: -0.75
OD_VA1: 20/25
OS_AXIS: 103
OD_CYLINDER: -1.25

## 2023-12-20 ASSESSMENT — REFRACTION_AUTOREFRACTION
OS_AXIS: 095
OD_AXIS: 107
OS_SPHERE: -2.00
OD_SPHERE: -2.25
OD_CYLINDER: -1.25
OS_CYLINDER: -0.75

## 2023-12-20 ASSESSMENT — SUPERFICIAL PUNCTATE KERATITIS (SPK)
OD_SPK: T
OS_SPK: T

## 2024-01-16 ENCOUNTER — NON-APPOINTMENT (OUTPATIENT)
Age: 64
End: 2024-01-16

## 2024-02-09 ENCOUNTER — NON-APPOINTMENT (OUTPATIENT)
Age: 64
End: 2024-02-09

## 2024-03-11 ENCOUNTER — NON-APPOINTMENT (OUTPATIENT)
Age: 64
End: 2024-03-11

## 2024-04-05 ENCOUNTER — NON-APPOINTMENT (OUTPATIENT)
Age: 64
End: 2024-04-05

## 2024-04-30 ENCOUNTER — NON-APPOINTMENT (OUTPATIENT)
Age: 64
End: 2024-04-30

## 2024-05-20 ENCOUNTER — APPOINTMENT (OUTPATIENT)
Dept: NEUROLOGY | Facility: CLINIC | Age: 64
End: 2024-05-20
Payer: MEDICARE

## 2024-05-20 VITALS
SYSTOLIC BLOOD PRESSURE: 126 MMHG | HEIGHT: 60 IN | BODY MASS INDEX: 29.45 KG/M2 | WEIGHT: 150 LBS | DIASTOLIC BLOOD PRESSURE: 70 MMHG

## 2024-05-20 DIAGNOSIS — G44.89 OTHER HEADACHE SYNDROME: ICD-10-CM

## 2024-05-20 DIAGNOSIS — S06.9X9S UNSPECIFIED INTRACRANIAL INJURY WITH LOSS OF CONSCIOUSNESS OF UNSPECIFIED DURATION, SEQUELA: ICD-10-CM

## 2024-05-20 PROCEDURE — G2211 COMPLEX E/M VISIT ADD ON: CPT

## 2024-05-20 PROCEDURE — 99214 OFFICE O/P EST MOD 30 MIN: CPT

## 2024-05-20 NOTE — DATA REVIEWED
[de-identified] : CT scan of the head was performed on 2/17/16. \par  There were chronic changes related to his prior injury. \par  There was no acute pathology.\par

## 2024-05-20 NOTE — PHYSICAL EXAM
[General Appearance - Alert] : alert [General Appearance - In No Acute Distress] : in no acute distress [Oriented To Time, Place, And Person] : oriented to person, place, and time [Over the Past 2 Weeks, Have You Felt Down, Depressed, or Hopeless?] : 1.) Over the past 2 weeks, have you felt down, depressed, or hopeless? No [Over the Past 2 Weeks, Have You Felt Little Interest or Pleasure Doing Things?] : 2.) Over the past 2 weeks, have you felt little interest or pleasure doing things? No [FreeTextEntry1] : There is some mild impairment in recent memory as compared to remote memory. [Cranial Nerves Optic (II)] : visual acuity intact bilaterally,  visual fields full to confrontation, pupils equal round and reactive to light [Cranial Nerves Oculomotor (III)] : extraocular motion intact [Cranial Nerves Trigeminal (V)] : facial sensation intact symmetrically [Cranial Nerves Facial (VII)] : face symmetrical [Cranial Nerves Vestibulocochlear (VIII)] : hearing was intact bilaterally [Cranial Nerves Glossopharyngeal (IX)] : tongue and palate midline [Cranial Nerves Accessory (XI - Cranial And Spinal)] : head turning and shoulder shrug symmetric [Cranial Nerves Hypoglossal (XII)] : there was no tongue deviation with protrusion [Sensation Tactile Decrease] : light touch was intact [Sensation Pain / Temperature Decrease] : pain and temperature was intact [Sensation Vibration Decrease] : vibration was intact [Limited Balance] : the patient's balance was impaired [Coordination - Dysmetria Impaired Finger-to-Nose Left Only] : not present on the left side [3+] : Patella right 3+ [2+] : Patella left 2+ [Plantar Reflex Right Only] : abnormal on the right [Plantar Reflex Left Only] : normal on the left [FreeTextEntry8] : Gait is broad-based and he uses a walker for support. He circumduct the right leg. Balance is impaired. [FreeTextEntry6] : There is increased tone throughout the right side. [Optic Disc Abnormality] : the optic disc were normal in size and color [Edema] : there was no peripheral edema [Involuntary Movements] : no involuntary movements were seen

## 2024-05-20 NOTE — ASSESSMENT
[FreeTextEntry1] : This is a 64-year-old man with a history of traumatic brain injury. He has residual spastic right sided hemiparesis. He does physical therapy for this. I recommended he use his helmet when he is ambulating outside. His foot doctor recommended a foot orthosis for ambulation. I have given him a prescription for this.  He also has residual headaches. He uses Percocet tablets for this. While this has been the only medication that has worked for his headaches, he does continue to report that his current dose maintains good pain control.  I will see him back in 6 months.

## 2024-05-20 NOTE — HISTORY OF PRESENT ILLNESS
[FreeTextEntry1] : I saw this patient in the office today. He presents with his sister today.  As you recall he has a history of traumatic brain injury and chronic headaches. He has residual right-sided spastic hemiparesis His headaches are now occurring on a daily basis.  They wax and wane in intensity. When severe they are associated with some dizziness.  He uses Percocet as needed for the pain.  It has been the only medication that has worked for him.  He uses a walker for ambulation at baseline. His gait and balance has been progressively declining.

## 2024-05-29 ENCOUNTER — NON-APPOINTMENT (OUTPATIENT)
Age: 64
End: 2024-05-29

## 2024-06-24 ENCOUNTER — NON-APPOINTMENT (OUTPATIENT)
Age: 64
End: 2024-06-24

## 2024-06-25 RX ORDER — OXYCODONE AND ACETAMINOPHEN 10; 325 MG/1; MG/1
10-325 TABLET ORAL
Qty: 90 | Refills: 0 | Status: ACTIVE | COMMUNITY
Start: 2018-06-15 | End: 1900-01-01

## 2024-07-22 ENCOUNTER — NON-APPOINTMENT (OUTPATIENT)
Age: 64
End: 2024-07-22

## 2024-08-19 ENCOUNTER — NON-APPOINTMENT (OUTPATIENT)
Age: 64
End: 2024-08-19

## 2024-09-15 ENCOUNTER — NON-APPOINTMENT (OUTPATIENT)
Age: 64
End: 2024-09-15

## 2024-10-01 ENCOUNTER — OFFICE (OUTPATIENT)
Dept: URBAN - METROPOLITAN AREA CLINIC 6 | Facility: CLINIC | Age: 64
Setting detail: OPHTHALMOLOGY
End: 2024-10-01
Payer: MEDICARE

## 2024-10-01 DIAGNOSIS — H16.223: ICD-10-CM

## 2024-10-01 DIAGNOSIS — H35.412: ICD-10-CM

## 2024-10-01 DIAGNOSIS — E11.9: ICD-10-CM

## 2024-10-01 DIAGNOSIS — H40.1131: ICD-10-CM

## 2024-10-01 DIAGNOSIS — H25.13: ICD-10-CM

## 2024-10-01 DIAGNOSIS — S06.2X6S: ICD-10-CM

## 2024-10-01 PROCEDURE — 92014 COMPRE OPH EXAM EST PT 1/>: CPT | Performed by: OPHTHALMOLOGY

## 2024-10-01 PROCEDURE — 92250 FUNDUS PHOTOGRAPHY W/I&R: CPT | Performed by: OPHTHALMOLOGY

## 2024-10-01 ASSESSMENT — PACHYMETRY
OD_CT_CORRECTION: 1
OS_CT_UM: 530
OS_CT_CORRECTION: 1
OD_CT_UM: 537

## 2024-10-01 ASSESSMENT — REFRACTION_MANIFEST
OD_AXIS: 105
OS_AXIS: 095
OS_AXIS: 100
OS_VA1: 20/25-2
OU_VA: 20/40
OD_CYLINDER: -0.75
OD_VA1: 20/25
OD_SPHERE: -2.25
OS_VA1: 20/25-2
OS_SPHERE: -2.00
OU_VA: 20/40
OD_ADD: +2.50
OS_ADD: +2.50
OS_CYLINDER: -0.75
OD_ADD: +2.50
OD_VA1: 20/25-1
OS_SPHERE: -1.50
OS_ADD: +2.50
OD_AXIS: 110
OS_CYLINDER: -0.75
OD_CYLINDER: -1.25
OD_SPHERE: -2.00

## 2024-10-01 ASSESSMENT — KERATOMETRY
METHOD_AUTO_MANUAL: AUTO
OS_K2POWER_DIOPTERS: 45.25
OD_K1POWER_DIOPTERS: 45.50
OD_AXISANGLE_DEGREES: 043
OD_K2POWER_DIOPTERS: 46.00
OS_K1POWER_DIOPTERS: 45.25
OS_AXISANGLE_DEGREES: 090

## 2024-10-01 ASSESSMENT — REFRACTION_CURRENTRX
OD_OVR_VA: 20/
OS_SPHERE: -2.25
OD_SPHERE: -2.25
OD_VPRISM_DIRECTION: SV
OS_VPRISM_DIRECTION: SV
OS_OVR_VA: 20/
OS_CYLINDER: -0.50
OS_AXIS: 110
OD_CYLINDER: -0.75
OD_AXIS: 102

## 2024-10-01 ASSESSMENT — REFRACTION_AUTOREFRACTION
OS_SPHERE: -1.75
OD_SPHERE: -2.00
OS_CYLINDER: -0.75
OD_CYLINDER: -1.00
OD_AXIS: 107
OS_AXIS: 100

## 2024-10-01 ASSESSMENT — CONFRONTATIONAL VISUAL FIELD TEST (CVF)
OS_FINDINGS: FULL
OD_FINDINGS: FULL

## 2024-10-01 ASSESSMENT — TONOMETRY
OS_IOP_MMHG: 14
OD_IOP_MMHG: 14

## 2024-10-01 ASSESSMENT — SUPERFICIAL PUNCTATE KERATITIS (SPK)
OD_SPK: T
OS_SPK: T

## 2024-10-01 ASSESSMENT — VISUAL ACUITY
OS_BCVA: 20/50-2
OD_BCVA: 20/80

## 2024-10-09 ENCOUNTER — NON-APPOINTMENT (OUTPATIENT)
Age: 64
End: 2024-10-09

## 2024-11-07 ENCOUNTER — NON-APPOINTMENT (OUTPATIENT)
Age: 64
End: 2024-11-07

## 2024-12-03 ENCOUNTER — APPOINTMENT (OUTPATIENT)
Dept: NEUROLOGY | Facility: CLINIC | Age: 64
End: 2024-12-03
Payer: MEDICARE

## 2024-12-03 DIAGNOSIS — G44.89 OTHER HEADACHE SYNDROME: ICD-10-CM

## 2024-12-03 DIAGNOSIS — S06.9X9S UNSPECIFIED INTRACRANIAL INJURY WITH LOSS OF CONSCIOUSNESS OF UNSPECIFIED DURATION, SEQUELA: ICD-10-CM

## 2024-12-03 PROCEDURE — G2211 COMPLEX E/M VISIT ADD ON: CPT

## 2024-12-03 PROCEDURE — 99213 OFFICE O/P EST LOW 20 MIN: CPT

## 2024-12-06 ENCOUNTER — NON-APPOINTMENT (OUTPATIENT)
Age: 64
End: 2024-12-06

## 2024-12-30 ENCOUNTER — NON-APPOINTMENT (OUTPATIENT)
Age: 64
End: 2024-12-30

## 2025-01-24 ENCOUNTER — NON-APPOINTMENT (OUTPATIENT)
Age: 65
End: 2025-01-24

## 2025-02-05 ENCOUNTER — OFFICE (OUTPATIENT)
Dept: URBAN - METROPOLITAN AREA CLINIC 6 | Facility: CLINIC | Age: 65
Setting detail: OPHTHALMOLOGY
End: 2025-02-05
Payer: MEDICARE

## 2025-02-05 DIAGNOSIS — S06.2X6S: ICD-10-CM

## 2025-02-05 DIAGNOSIS — H40.1131: ICD-10-CM

## 2025-02-05 DIAGNOSIS — E11.9: ICD-10-CM

## 2025-02-05 DIAGNOSIS — H25.13: ICD-10-CM

## 2025-02-05 DIAGNOSIS — H16.223: ICD-10-CM

## 2025-02-05 PROCEDURE — 99213 OFFICE O/P EST LOW 20 MIN: CPT | Performed by: OPHTHALMOLOGY

## 2025-02-05 PROCEDURE — 92083 EXTENDED VISUAL FIELD XM: CPT | Performed by: OPHTHALMOLOGY

## 2025-02-05 ASSESSMENT — REFRACTION_CURRENTRX
OD_AXIS: 102
OD_VPRISM_DIRECTION: SV
OS_VPRISM_DIRECTION: SV
OS_SPHERE: -2.25
OS_CYLINDER: -0.50
OS_OVR_VA: 20/
OD_CYLINDER: -0.75
OS_AXIS: 110
OD_SPHERE: -2.25
OD_OVR_VA: 20/

## 2025-02-05 ASSESSMENT — REFRACTION_MANIFEST
OD_ADD: +2.50
OS_ADD: +2.50
OS_SPHERE: -1.50
OD_CYLINDER: -1.25
OS_VA1: 20/25-2
OD_SPHERE: -2.00
OU_VA: 20/40
OD_CYLINDER: -0.75
OD_AXIS: 110
OD_ADD: +2.50
OD_VA1: 20/25-1
OS_AXIS: 095
OS_CYLINDER: -0.75
OD_SPHERE: -2.25
OS_ADD: +2.50
OU_VA: 20/40
OS_AXIS: 100
OS_VA1: 20/25-2
OD_VA1: 20/25
OS_CYLINDER: -0.75
OS_SPHERE: -2.00
OD_AXIS: 105

## 2025-02-05 ASSESSMENT — PACHYMETRY
OS_CT_UM: 530
OD_CT_CORRECTION: 1
OS_CT_CORRECTION: 1
OD_CT_UM: 537

## 2025-02-05 ASSESSMENT — KERATOMETRY
OS_K2POWER_DIOPTERS: 45.25
OD_AXISANGLE_DEGREES: 065
OS_K1POWER_DIOPTERS: 45.00
METHOD_AUTO_MANUAL: AUTO
OD_K2POWER_DIOPTERS: 46.00
OD_K1POWER_DIOPTERS: 45.00
OS_AXISANGLE_DEGREES: 042

## 2025-02-05 ASSESSMENT — VISUAL ACUITY
OD_BCVA: 20/80
OS_BCVA: 20/40

## 2025-02-05 ASSESSMENT — REFRACTION_AUTOREFRACTION
OD_AXIS: 116
OD_SPHERE: -2.00
OD_CYLINDER: -1.25
OS_SPHERE: -1.50
OS_CYLINDER: -1.00
OS_AXIS: 096

## 2025-02-05 ASSESSMENT — SUPERFICIAL PUNCTATE KERATITIS (SPK)
OD_SPK: T
OS_SPK: T

## 2025-02-05 ASSESSMENT — TONOMETRY
OS_IOP_MMHG: 15
OD_IOP_MMHG: 15

## 2025-02-22 ENCOUNTER — NON-APPOINTMENT (OUTPATIENT)
Age: 65
End: 2025-02-22

## 2025-03-18 ENCOUNTER — NON-APPOINTMENT (OUTPATIENT)
Age: 65
End: 2025-03-18

## 2025-04-15 ENCOUNTER — NON-APPOINTMENT (OUTPATIENT)
Age: 65
End: 2025-04-15

## 2025-05-13 ENCOUNTER — NON-APPOINTMENT (OUTPATIENT)
Age: 65
End: 2025-05-13

## 2025-06-11 ENCOUNTER — NON-APPOINTMENT (OUTPATIENT)
Age: 65
End: 2025-06-11

## 2025-06-19 ENCOUNTER — APPOINTMENT (OUTPATIENT)
Dept: NEUROLOGY | Facility: CLINIC | Age: 65
End: 2025-06-19
Payer: MEDICARE

## 2025-06-19 VITALS
SYSTOLIC BLOOD PRESSURE: 121 MMHG | WEIGHT: 150 LBS | DIASTOLIC BLOOD PRESSURE: 66 MMHG | HEIGHT: 60 IN | HEART RATE: 111 BPM | BODY MASS INDEX: 29.45 KG/M2

## 2025-06-19 PROCEDURE — G2211 COMPLEX E/M VISIT ADD ON: CPT

## 2025-06-19 PROCEDURE — 99213 OFFICE O/P EST LOW 20 MIN: CPT

## 2025-07-09 ENCOUNTER — NON-APPOINTMENT (OUTPATIENT)
Age: 65
End: 2025-07-09

## 2025-08-05 ENCOUNTER — NON-APPOINTMENT (OUTPATIENT)
Age: 65
End: 2025-08-05

## 2025-08-06 ENCOUNTER — OFFICE (OUTPATIENT)
Dept: URBAN - METROPOLITAN AREA CLINIC 6 | Facility: CLINIC | Age: 65
Setting detail: OPHTHALMOLOGY
End: 2025-08-06
Payer: MEDICARE

## 2025-08-06 DIAGNOSIS — H16.223: ICD-10-CM

## 2025-08-06 DIAGNOSIS — H35.412: ICD-10-CM

## 2025-08-06 DIAGNOSIS — E11.9: ICD-10-CM

## 2025-08-06 DIAGNOSIS — S06.2X6S: ICD-10-CM

## 2025-08-06 DIAGNOSIS — H25.13: ICD-10-CM

## 2025-08-06 DIAGNOSIS — H40.1131: ICD-10-CM

## 2025-08-06 PROCEDURE — 92014 COMPRE OPH EXAM EST PT 1/>: CPT | Performed by: OPHTHALMOLOGY

## 2025-08-06 PROCEDURE — 92250 FUNDUS PHOTOGRAPHY W/I&R: CPT | Performed by: OPHTHALMOLOGY

## 2025-08-06 ASSESSMENT — REFRACTION_MANIFEST
OS_VA1: 20/25-2
OD_SPHERE: -2.25
OD_AXIS: 120
OS_VA1: 20/25
OD_VA1: 20/25
OS_AXIS: 095
OD_CYLINDER: -1.25
OS_ADD: +2.50
OD_ADD: +2.50
OD_SPHERE: -1.75
OS_CYLINDER: -0.75
OS_CYLINDER: -1.00
OS_AXIS: 095
OS_SPHERE: -2.00
OD_CYLINDER: -1.50
OD_VA1: 20/20
OD_ADD: +2.50
OS_ADD: +2.50
OS_SPHERE: -1.75
OU_VA: 20/40
OU_VA: 20/20
OD_AXIS: 105

## 2025-08-06 ASSESSMENT — KERATOMETRY
OD_K1POWER_DIOPTERS: 44.75
OS_K2POWER_DIOPTERS: 45.25
OD_K2POWER_DIOPTERS: 45.25
OS_AXISANGLE_DEGREES: 077
OS_K1POWER_DIOPTERS: 45.00
METHOD_AUTO_MANUAL: AUTO
OD_AXISANGLE_DEGREES: 033

## 2025-08-06 ASSESSMENT — REFRACTION_CURRENTRX
OS_CYLINDER: -0.50
OD_SPHERE: -2.25
OD_OVR_VA: 20/
OS_VPRISM_DIRECTION: SV
OD_CYLINDER: -0.75
OS_SPHERE: -2.25
OD_VPRISM_DIRECTION: SV
OS_AXIS: 110
OS_OVR_VA: 20/
OD_AXIS: 102

## 2025-08-06 ASSESSMENT — CONFRONTATIONAL VISUAL FIELD TEST (CVF)
OS_FINDINGS: FULL
OD_FINDINGS: FULL

## 2025-08-06 ASSESSMENT — REFRACTION_AUTOREFRACTION
OD_CYLINDER: -1.50
OD_SPHERE: -1.75
OS_AXIS: 094
OS_SPHERE: -1.75
OS_CYLINDER: -1.00
OD_AXIS: 117

## 2025-08-06 ASSESSMENT — VISUAL ACUITY
OD_BCVA: 20/50
OS_BCVA: 20/40

## 2025-08-06 ASSESSMENT — TONOMETRY
OD_IOP_MMHG: 16
OS_IOP_MMHG: 16

## 2025-08-06 ASSESSMENT — SUPERFICIAL PUNCTATE KERATITIS (SPK)
OS_SPK: T
OD_SPK: T

## 2025-08-06 ASSESSMENT — PACHYMETRY
OS_CT_UM: 530
OS_CT_CORRECTION: 1
OD_CT_CORRECTION: 1
OD_CT_UM: 537

## 2025-09-03 ENCOUNTER — NON-APPOINTMENT (OUTPATIENT)
Age: 65
End: 2025-09-03